# Patient Record
(demographics unavailable — no encounter records)

---

## 2017-09-30 NOTE — ER DOCUMENT REPORT
ED Hand/Wrist Injury





- General


Chief Complaint: Finger Injury


Stated Complaint: FINGER INJURY


Time Seen by Provider: 09/30/17 22:46





- HPI


Patient complains to provider of: pinky swelling and pain


Injury to: Small finger


Onset: Last week


Timing: Worse


Quality of pain: Achy, Throbbing


Severity: Moderate





- Related Data


Allergies/Adverse Reactions: 


 





cefoxitin Allergy (Verified 09/30/17 22:40)


 











Past Medical History





- Social History


Smoking Status: Unknown if Ever Smoked


Family History: Reviewed & Not Pertinent


Renal/ Medical History: Denies: Hx Peritoneal Dialysis





Review of Systems





- Review of Systems


Constitutional: No symptoms reported


Musculoskeletal: See HPI


Skin: See HPI


-: Yes All other systems reviewed and negative





Physical Exam





- Vital signs


Vitals: 


 











Temp Pulse BP Pulse Ox


 


 97.6 F   64   130/88 H  96 


 


 10/01/17 00:57  10/01/17 00:57  10/01/17 00:57  10/01/17 00:57














- General


General appearance: Appears well, Alert


In distress: None





- Cardiovascular


Pulses: Normal: Radial


Normal capillary refill: Yes





- Extremities


Hand: Other - paronychia medial nail bed with cellulitis of the fat pad.  No: 

Normal, Nontender, Tender, Abrasion, Deformity, Dislocation, Ecchymosis, 

Instability, Nail injury, Laceration, No evidence of human bite, No evidence of 

FB, Swelling, Tendon deficit





- Skin


Skin Temperature: Warm


Skin Moisture: Dry


Skin Color: Normal


Skin Turgor: Elastic





Course





- Re-evaluation


Re-evalutation: 


9/30/17 23:00


Patient is a 34-year-old male who presents emergency department with a 

paronychia of the right hand of the medial nail bed of the small finger.  No 

evidence of felon, tendon inflammation.  Range of motion intact.  I&D at the 

bedside with approximately 2 cc of purulent drainage.  Sterile dressing placed 

and patient initiated on antibiotics given cellulitis of the fat pad.  Patient 

to follow-up with primary care.  Patient agrees with plan.





- Vital Signs


Vital signs: 


 











Temp Pulse Resp BP Pulse Ox


 


 97.6 F   64      130/88 H  96 


 


 10/01/17 00:57  10/01/17 00:57     10/01/17 00:57  10/01/17 00:57














Procedures





- Incision and Drainage


  ** Left Hand 5th digit


Type: Simple


Anesthetic type: 1% Lidocaine


mL's of anesthetic: 5 - digital block


Blade size: 11


I&D procedure: Betadine prep applied


Incision Method: Incision made by scalpel


Amount/type of drainage: 2cc purulent material





Discharge





- Discharge


Clinical Impression: 


 Paronychia





Condition: Good


Disposition: HOME, SELF-CARE


Instructions:  Acetaminophen, Use of Over-The-Counter Ibuprofen ([facility]), 

Paronychia ([facility]), Post Incision and Drainage


Additional Instructions: 


For severe pain, you can take 500mg tylenol with 800mg ibuprofen at a time if 

you havent been taking it throughout the day


Where the splint for comfort


Be sure the wash the wound 4 times a day


Prescriptions: 


Clindamycin HCl 450 mg PO TID #21 capsule


Forms:  Special Work Note, Return to Work